# Patient Record
Sex: FEMALE | Race: WHITE | NOT HISPANIC OR LATINO | URBAN - METROPOLITAN AREA
[De-identification: names, ages, dates, MRNs, and addresses within clinical notes are randomized per-mention and may not be internally consistent; named-entity substitution may affect disease eponyms.]

---

## 2017-10-26 ENCOUNTER — APPOINTMENT (RX ONLY)
Dept: URBAN - METROPOLITAN AREA OTHER 13 | Facility: OTHER | Age: 42
Setting detail: DERMATOLOGY
End: 2017-10-26

## 2017-10-26 DIAGNOSIS — L63.8 OTHER ALOPECIA AREATA: ICD-10-CM

## 2017-10-26 DIAGNOSIS — L65.8 OTHER SPECIFIED NONSCARRING HAIR LOSS: ICD-10-CM

## 2017-10-26 PROCEDURE — ? RECOMMENDATIONS

## 2017-10-26 PROCEDURE — ? TREATMENT REGIMEN

## 2017-10-26 PROCEDURE — 99213 OFFICE O/P EST LOW 20 MIN: CPT

## 2017-10-26 PROCEDURE — ? OTHER

## 2017-10-26 PROCEDURE — ? COUNSELING

## 2017-10-26 NOTE — PROCEDURE: OTHER
Detail Level: Zone
Other (Free Text): Blood work most recently done by Qiana Bowling MD
Note Text (......Xxx Chief Complaint.): This diagnosis correlates with the

## 2017-10-26 NOTE — PROCEDURE: TREATMENT REGIMEN
Detail Level: Simple
Otc Regimen: When safe, Rogaine takes 3 to 4 months to notice improvements \\nBiotin \\nIron supplement if PCP states
Modify Regimen: Olux foam in limited amounts
Plan: Patient states hematologist blood work came back fine, ferritin low\\nLabs requested today from Dr Bowling\\nPatient currently breastfeeding

## 2017-10-26 NOTE — PROCEDURE: RECOMMENDATIONS
Recommendations (Free Text): Recommend Biotin 5000mcg/day.  Alopecia handout given to patient.  Can try Rogaine daily, will take 3 months to be able to notice change
Recommendation Preamble: The following recommendations were made during the visit: gentle skin care products, aquaphor to lips, daily SPF with repeated application.  No other acne topical or oral medication during treatment.
Detail Level: Zone

## 2020-01-27 ENCOUNTER — APPOINTMENT (RX ONLY)
Dept: URBAN - METROPOLITAN AREA OTHER 13 | Facility: OTHER | Age: 45
Setting detail: DERMATOLOGY
End: 2020-01-27

## 2020-01-27 DIAGNOSIS — L65.9 NONSCARRING HAIR LOSS, UNSPECIFIED: ICD-10-CM

## 2020-01-27 PROCEDURE — ? BIOPSY BY PUNCH METHOD

## 2020-01-27 PROCEDURE — ? OTHER

## 2020-01-27 PROCEDURE — ? HAIR PREP

## 2020-01-27 PROCEDURE — 11104 PUNCH BX SKIN SINGLE LESION: CPT

## 2020-01-27 PROCEDURE — 96902 MCRSCP XM HAIR PLUCK/CLIPPED: CPT

## 2020-01-27 PROCEDURE — ? COUNSELING

## 2020-01-27 ASSESSMENT — LOCATION ZONE DERM: LOCATION ZONE: SCALP

## 2020-01-27 ASSESSMENT — LOCATION DETAILED DESCRIPTION DERM
LOCATION DETAILED: LEFT SUPERIOR PARIETAL SCALP
LOCATION DETAILED: MID-FRONTAL SCALP

## 2020-01-27 ASSESSMENT — LOCATION SIMPLE DESCRIPTION DERM
LOCATION SIMPLE: SCALP
LOCATION SIMPLE: ANTERIOR SCALP

## 2020-01-27 NOTE — HPI: HAIR LOSS
How Did The Hair Loss Occur?: gradual in onset
How Severe Is Your Hair Loss?: moderate
What Hair Products Do You Use?: Rogaine men’s strength

## 2020-01-27 NOTE — PROCEDURE: OTHER
Note Text (......Xxx Chief Complaint.): This diagnosis correlates with the
Detail Level: Zone
Other (Free Text): HAIR PULL TEST NEGATIVE

## 2020-02-10 ENCOUNTER — APPOINTMENT (RX ONLY)
Dept: URBAN - METROPOLITAN AREA OTHER 13 | Facility: OTHER | Age: 45
Setting detail: DERMATOLOGY
End: 2020-02-10

## 2020-02-10 DIAGNOSIS — L64.8 OTHER ANDROGENIC ALOPECIA: ICD-10-CM

## 2020-02-10 PROCEDURE — ? COUNSELING

## 2020-02-10 PROCEDURE — ? PRESCRIPTION

## 2020-02-10 PROCEDURE — ? SUTURE REMOVAL (GLOBAL PERIOD)

## 2020-02-10 PROCEDURE — ? TREATMENT REGIMEN

## 2020-02-10 ASSESSMENT — LOCATION SIMPLE DESCRIPTION DERM: LOCATION SIMPLE: RIGHT FOREHEAD

## 2020-02-10 ASSESSMENT — LOCATION DETAILED DESCRIPTION DERM: LOCATION DETAILED: RIGHT SUPERIOR MEDIAL FOREHEAD

## 2020-02-10 ASSESSMENT — LOCATION ZONE DERM: LOCATION ZONE: FACE

## 2020-02-10 NOTE — PROCEDURE: SUTURE REMOVAL (GLOBAL PERIOD)
Detail Level: Detailed
Add 77290 Cpt? (Important Note: In 2017 The Use Of 05041 Is Being Tracked By Cms To Determine Future Global Period Reimbursement For Global Periods): no

## 2020-06-24 RX ORDER — SPIRONOLACTONE 25 MG/1
TABLET, FILM COATED ORAL
Qty: 30 | Refills: 0 | Status: ERX

## 2020-07-07 ENCOUNTER — APPOINTMENT (RX ONLY)
Dept: URBAN - METROPOLITAN AREA OTHER 13 | Facility: OTHER | Age: 45
Setting detail: DERMATOLOGY
End: 2020-07-07

## 2020-07-07 DIAGNOSIS — L64.8 OTHER ANDROGENIC ALOPECIA: ICD-10-CM | Status: IMPROVED

## 2020-07-07 PROCEDURE — ? COUNSELING

## 2020-07-07 PROCEDURE — ? TREATMENT REGIMEN

## 2020-07-07 PROCEDURE — ? PRESCRIPTION

## 2020-07-07 PROCEDURE — 99212 OFFICE O/P EST SF 10 MIN: CPT

## 2020-07-07 RX ORDER — SPIRONOLACTONE 50 MG/1
TABLET, FILM COATED ORAL
Qty: 50 | Refills: 2 | Status: ERX | COMMUNITY
Start: 2020-07-07

## 2020-07-07 RX ADMIN — SPIRONOLACTONE: 50 TABLET, FILM COATED ORAL at 00:00

## 2020-07-07 NOTE — PROCEDURE: TREATMENT REGIMEN
Continue Regimen: Men’s Rogaine solution twice daily to scalp
Detail Level: Zone
Modify Regimen: Increased Spironolactone to 50mg qd
Plan: 3 month follow up, plan to increase Spironolactone to 75mg

## 2020-10-12 ENCOUNTER — APPOINTMENT (RX ONLY)
Dept: URBAN - METROPOLITAN AREA OTHER 13 | Facility: OTHER | Age: 45
Setting detail: DERMATOLOGY
End: 2020-10-12

## 2020-10-12 DIAGNOSIS — L64.8 OTHER ANDROGENIC ALOPECIA: ICD-10-CM

## 2020-10-12 PROCEDURE — 99213 OFFICE O/P EST LOW 20 MIN: CPT

## 2020-10-12 PROCEDURE — ? COUNSELING

## 2020-10-12 PROCEDURE — ? TREATMENT REGIMEN

## 2020-10-12 ASSESSMENT — LOCATION DETAILED DESCRIPTION DERM
LOCATION DETAILED: LEFT CENTRAL FRONTAL SCALP
LOCATION DETAILED: RIGHT SUPERIOR PARIETAL SCALP
LOCATION DETAILED: RIGHT MEDIAL FOREHEAD

## 2020-10-12 ASSESSMENT — LOCATION SIMPLE DESCRIPTION DERM
LOCATION SIMPLE: LEFT SCALP
LOCATION SIMPLE: SCALP
LOCATION SIMPLE: RIGHT FOREHEAD

## 2020-10-12 ASSESSMENT — LOCATION ZONE DERM
LOCATION ZONE: FACE
LOCATION ZONE: SCALP

## 2020-10-12 NOTE — PROCEDURE: TREATMENT REGIMEN
Continue Regimen: Rogaine (minoxidil) solution as directed to scalp
Detail Level: Zone
Discontinue Regimen: Spironolactone
Otc Regimen: Discussed the benefits of taking Ashwaganda, Curcumin, and Saw Palmetto supplements separately or purchasing Nutrafol online  (contains all supplements combined)
Plan: Return to clinic in 10 months

## 2021-10-11 ENCOUNTER — APPOINTMENT (RX ONLY)
Dept: URBAN - METROPOLITAN AREA OTHER 13 | Facility: OTHER | Age: 46
Setting detail: DERMATOLOGY
End: 2021-10-11

## 2021-10-11 DIAGNOSIS — L64.8 OTHER ANDROGENIC ALOPECIA: ICD-10-CM | Status: STABLE

## 2021-10-11 PROCEDURE — ? TREATMENT REGIMEN

## 2021-10-11 PROCEDURE — 99212 OFFICE O/P EST SF 10 MIN: CPT

## 2021-10-11 PROCEDURE — ? COUNSELING

## 2021-10-11 ASSESSMENT — LOCATION DETAILED DESCRIPTION DERM: LOCATION DETAILED: RIGHT SUPERIOR MEDIAL FOREHEAD

## 2021-10-11 ASSESSMENT — LOCATION SIMPLE DESCRIPTION DERM: LOCATION SIMPLE: RIGHT FOREHEAD

## 2021-10-11 ASSESSMENT — LOCATION ZONE DERM: LOCATION ZONE: FACE

## 2021-10-11 NOTE — PROCEDURE: TREATMENT REGIMEN
Continue Regimen: Rogaine (minoxidil) solution as directed to scalp
Detail Level: Zone
Initiate Treatment: [employer]s Recipe Hair Oil at night and massage into trouble areas.can buy online. Hand out given.
Plan: Discussed the benefits of taking Ashwaganda, Curcumin, and Saw Palmetto supplements separately or purchasing Nutrafol online  (contains all supplements combined) - will consider after coming off Tomoxifin

## 2023-09-06 NOTE — PROCEDURE: BIOPSY BY PUNCH METHOD
Render In Bullet Format When Appropriate: No
Billing Type: Third-Party Bill
Punch Size In Mm: 4
Wound Care: Petrolatum
Anesthesia Volume In Cc: 0.5
Hemostasis: None
Lab: 373
Biopsy Type: H and E
X Depth Of Punch In Cm (Optional): 0
Epidermal Sutures: 4-0 Nylon
Was A Bandage Applied: Yes
Anesthesia Type: 1% lidocaine with epinephrine
Home Suture Removal Text: Patient was provided a home suture removal kit and will remove their sutures at home.  If they have any questions or difficulties they will call the office.
Dressing: bandage
Post-Care Instructions: I reviewed with the patient in detail post-care instructions. Patient is to keep the biopsy site dry overnight, and then apply bacitracin twice daily until healed. Patient may apply hydrogen peroxide soaks to remove any crusting.
Detail Level: Detailed
Consent: Written consent was obtained and risks were reviewed including but not limited to scarring, infection, bleeding, scabbing, incomplete removal, nerve damage and allergy to anesthesia.
Suture Removal: 14 days
Notification Instructions: Patient will be notified of biopsy results. However, patient instructed to call the office if not contacted within 2 weeks.
b/l lower extremity edema+2